# Patient Record
(demographics unavailable — no encounter records)

---

## 2024-11-19 NOTE — ASSESSMENT
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 Discussed management to consist of NSAIDs prn, brace and ease into use. Discussed risk of persistent instability, pain and swelling.  OT script for right middle finger relative extension splint provided.  Will obtain right hand MRI without contrast to assess for sagittal band rupture. Will follow-up thereafter to discuss results and develop plan of care.  F/u after MRI

## 2024-11-19 NOTE — IMAGING
[de-identified] : EXAM Right hand with mild swelling; no erythema; no ecchymosis. +ttp at 3rd MCP, +swelling, ulnar deviation to middle finger. Able to make a composite fist, oppose thumb to small finger and abduct fingers. <2sec cap refill.  Right hand radiographs with no fracture nor dislocation. Carpus alignment intact. (3-view) Home

## 2024-11-19 NOTE — HISTORY OF PRESENT ILLNESS
[de-identified] : 64M, RHD presents with right hand pain since 11/15/24 no specific cause of injury/trauma. Went to Urgent care and [facility] and was advised of having colitis, patient was prescribed doxycycline, methocarbamol and cephalexin. Returned to [facility] had XR imaging done that were unremarkable. Denies numbness/ tingling.

## 2024-12-03 NOTE — ASSESSMENT
[Arterial/Venous Disease] : arterial/venous disease [Other: _____] : [unfilled] [FreeTextEntry1] : Impression asymptomatic arterial insuff  and symptomatic venous insuff   and asymptomatic carotid stenosis   Plan Med conserv management  protective measures, leg elevation, knee high comp stockings 20-30mm hg, wt loss, diet control continue asa baby daily alon/pvr s/o art insuff  prn if sx develop office visit w carotid duplex s/o stenosis in 2.5 yrs March 2027 Return to office in 6 months June 2025 to evaluate bilateral legs   Varicose veins are enlarged, twisted veins. Varicose veins are caused by increased blood pressure in the veins.  The blood moves towards the heart by 1-way valves in the veins. When the valves become weakened or damaged, blood can collect in the veins and pool in your lower legs (ankles). This causes the veins to become enlarged and incompetent with reflux. Sitting or standing for long periods can cause blood to pool in the leg veins, increasing the pressure within the veins.  Risk factors for varicose veins or venous disease may include:  obesity, older age, standing or sitting for prolonged periods of time for several years, being female, pregnancy, taking oral contraceptive pills or hormone replacement, being inactive, and/or smoking.  The most common symptoms of varicose veins are sensations in the legs, such as a heavy feeling, burning, and/or aching. However, each individual may experience symptoms differently.  Other symptoms may include:  color changes in the skin, sores on the legs, or rash.  Severe varicose veins or venous disease may eventually produce long-term mild swelling that can result in more serious skin and tissue problems, such as ulcers and non-healing sores. Varicose veins and venous disease are diagnosed by a complete medical history, physical examination, and diagnostic studies for varicose veins including duplex ultrasound and color-flow imaging.   Medical treatment for varicose veins and venous disease include:  compression stockings, sclerotherapy, endovenous ablation and/or surgical treatment with microphlebectomy.

## 2024-12-03 NOTE — HISTORY OF PRESENT ILLNESS
[FreeTextEntry1] : pt c/o rene le  mild to moderate  swelling   \par  intensity mild  since last ov \par  pt is partially compliant w comp stockings \par  pt denies art insuff sx at this time \par   [de-identified] : pt is here to evaluate bilateral lower extremities pt is doing well no new complaints reports leg swelling R>L is overall stable states he forgets to wear his compression stockings denies claudication, rest pain or wounds takes asa and statin

## 2024-12-03 NOTE — PHYSICAL EXAM
[1+] : right 1+ [2+] : right 2+ [Ankle Swelling (On Exam)] : present [Ankle Swelling Bilaterally] : bilaterally  [Varicose Veins Of Lower Extremities] : bilaterally [] : bilaterally [Ankle Swelling On The Right] : mild [No Rash or Lesion] : No rash or lesion [Alert] : alert [Oriented to Person] : oriented to person [Oriented to Place] : oriented to place [Oriented to Time] : oriented to time [Calm] : calm [0] : left 0 [JVD] : no jugular venous distention  [de-identified] : nad [de-identified] : wnl [de-identified] : no respiratory distress [FreeTextEntry1] : Moderate bilateral leg venous insufficiency  w mild to  moderate bilateral leg stasis dermatitis  and mild bilateral leg edema R>L Mild arterial insufficiency w mild  to mod trophic skin changes felisa tips of toes no wounds/ulcers  [de-identified] : wnl [de-identified] : Mars Cranial nerves 2-12 mars grossly intact [de-identified] : cooperative

## 2024-12-04 NOTE — IMAGING
[de-identified] : EXAM Right hand with mild swelling; no erythema; no ecchymosis. +ttp at 3rd MCP, +swelling, ulnar deviation to middle finger. Able to make a composite fist, oppose thumb to small finger and abduct fingers. <2sec cap refill.  Right hand radiographs with no fracture nor dislocation. Carpus alignment intact. (3-view) Right hand RMI with rupture of radial sagittal band of 3rd MCP.

## 2024-12-04 NOTE — HISTORY OF PRESENT ILLNESS
[de-identified] : 64M, RHD presents with right hand pain since 11/15/24 no specific cause of injury/trauma. Went to Urgent care and [facility] and was advised of having colitis, patient was prescribed doxycycline, methocarbamol and cephalexin. Returned to [facility] had XR imaging done that were unremarkable. Denies numbness/ tingling.   12/3/24: Here to review MRI test results (O&C).

## 2024-12-04 NOTE — HISTORY OF PRESENT ILLNESS
[de-identified] : 64M, RHD presents with right hand pain since 11/15/24 no specific cause of injury/trauma. Went to Urgent care and [facility] and was advised of having colitis, patient was prescribed doxycycline, methocarbamol and cephalexin. Returned to [facility] had XR imaging done that were unremarkable. Denies numbness/ tingling.   12/3/24: Here to review MRI test results (O&C).

## 2024-12-04 NOTE — IMAGING
[de-identified] : EXAM Right hand with mild swelling; no erythema; no ecchymosis. +ttp at 3rd MCP, +swelling, ulnar deviation to middle finger. Able to make a composite fist, oppose thumb to small finger and abduct fingers. <2sec cap refill.  Right hand radiographs with no fracture nor dislocation. Carpus alignment intact. (3-view) Right hand RMI with rupture of radial sagittal band of 3rd MCP. Cyclosporine Pregnancy And Lactation Text: This medication is Pregnancy Category C and it isn't know if it is safe during pregnancy. This medication is excreted in breast milk.

## 2024-12-04 NOTE — ASSESSMENT
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 Discussed management to consist of NSAIDs prn, brace and ease into use. Discussed risk of persistent instability, pain and swelling. Continue brace.  F/u 4weeks

## 2025-01-14 NOTE — ASSESSMENT
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 Discussed management to consist of NSAIDs prn, brace and ease into use. Discussed risk of persistent instability, pain and swelling. D/c brace, ease into use, OT prn.  F/u prn

## 2025-01-14 NOTE — HISTORY OF PRESENT ILLNESS
[de-identified] : 64M, RHD presents with right hand pain since 11/15/24 no specific cause of injury/trauma. Went to Urgent care and [facility] and was advised of having colitis, patient was prescribed doxycycline, methocarbamol and cephalexin. Returned to [facility] had XR imaging done that were unremarkable. Denies numbness/ tingling.   12/3/24: Here to review MRI test results (O&C).  1/14/25: Patient presents for a follow up for right hand pain. attending occupation therapy at [facility]. Continues to experience tightness and taping.

## 2025-01-14 NOTE — IMAGING
[de-identified] : EXAM Right hand with mild swelling; no erythema; no ecchymosis. +ttp at 3rd MCP, Mild swelling, ulnar deviation to middle finger. Able to make a composite fist, oppose thumb to small finger and abduct fingers. <2sec cap refill.  Right hand radiographs with no fracture nor dislocation. Carpus alignment intact. (3-view) Right hand RMI with rupture of radial sagittal band of 3rd MCP.

## 2025-02-07 NOTE — IMAGING
[de-identified] : EXAM Right hand with mild swelling; no erythema; no ecchymosis. +ttp at 3rd MCP, Mild swelling, ulnar deviation to middle finger. Able to make a composite fist, oppose thumb to small finger and abduct fingers. <2sec cap refill.  Right hand radiographs with no fracture nor dislocation. Carpus alignment intact. (3-view) Right hand RMI with rupture of radial sagittal band of 3rd MCP.

## 2025-02-07 NOTE — HISTORY OF PRESENT ILLNESS
[de-identified] : 64M, RHD presents with right hand pain since 11/15/24 no specific cause of injury/trauma. Went to Urgent care and [facility] and was advised of having colitis, patient was prescribed doxycycline, methocarbamol and cephalexin. Returned to [facility] had XR imaging done that were unremarkable. Denies numbness/ tingling.   12/3/24: Here to review MRI test results (O&C).  1/14/25: Patient presents for a follow up for right hand pain. attending occupation therapy at [facility]. Continues to experience tightness and taping.   2/7/25: Follow up right hand. Patient reports right middle finger locking up for 1 day.

## 2025-02-07 NOTE — ASSESSMENT
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 Discussed management to consist of NSAIDs prn, brace and ease into use. Discussed risk of persistent instability, pain and swelling. D/c brace, ease into use, OT prn. WBAT  F/u 2mo

## 2025-05-12 NOTE — DATA REVIEWED
[FreeTextEntry1] : 3/2/2016 KYLE/PVR moderate infragenic art insuff w vessel calcification rt kyle 1.21 lt kyle 1.13  4/27/2016 Carotid Duplex Mars  Ica less 50% stenosis Mars ant VA flow   2/7/2017 KYLE/PVR mild infragenic art insuff w vessel calcification rt kyle 1.21 lt kyle 1.24  2/7/2017 Venous Doppler mars le no acute dvt svt RLE GSV insuff SFJ to knee w insuff collaterals LLE insuff GSV  in calf only   1/11/2019 KYLE/PVR moderate infragenic art insuff w vessel calcification rt kyle 1.27 lt kyel 1.22  1/26/2020 Carotid Duplex Mars  Ica less 50% stenosis Mars ant VA flow   3/26/2024 Carotid Duplex Mars ICA less than 50% stenosis Mars antegrade vertebral artery flow 
3